# Patient Record
Sex: FEMALE
[De-identification: names, ages, dates, MRNs, and addresses within clinical notes are randomized per-mention and may not be internally consistent; named-entity substitution may affect disease eponyms.]

---

## 2019-06-19 ENCOUNTER — APPOINTMENT (OUTPATIENT)
Dept: ORTHOPEDIC SURGERY | Facility: CLINIC | Age: 44
End: 2019-06-19
Payer: OTHER MISCELLANEOUS

## 2019-06-19 VITALS
BODY MASS INDEX: 25.61 KG/M2 | TEMPERATURE: 98.4 F | HEIGHT: 64 IN | HEART RATE: 59 BPM | SYSTOLIC BLOOD PRESSURE: 101 MMHG | DIASTOLIC BLOOD PRESSURE: 66 MMHG | WEIGHT: 150 LBS

## 2019-06-19 DIAGNOSIS — E11.9 TYPE 2 DIABETES MELLITUS W/OUT COMPLICATIONS: ICD-10-CM

## 2019-06-19 PROCEDURE — 99204 OFFICE O/P NEW MOD 45 MIN: CPT

## 2019-06-19 NOTE — HISTORY OF PRESENT ILLNESS
[FreeTextEntry1] : the patient is a 43-year-old female who presents for evaluation of right shoulder pain following a fall on the third. Since that time she has had pain as well as "clicking" of the shoulder with range of motion. She also notes a sensation of clicking in the middleand ring fingers on the right side as well as her wrist, she denies paresthesias or locking of the digits.

## 2019-06-19 NOTE — PHYSICAL EXAM
[de-identified] : x-rays of the shoulder and hand are reviewed from an outside imaging source there is no fracture or dislocation, no osseus abnormality [de-identified] : Right shoulder exam\par \par Inspection: No malalignment, No defects\par Skin: No masses, No lesions\par Neck: Negative Spurlings, full ROM without pain\par ROM: Range of motion of bilateral shoulders intact, palpable crepitus on the right side with subjective pain \par Tenderness: No bicipital tenderness, no tenderness to the greater tuberosity/RTC insertion, no anterior shoulder/lesser tuberosity tenderness\par Strength: 5/5 ER, 5/5 IR in adduction, 5/5 supraspinatus testing\par AC Joint: No ttp, no pain with cross arm testing\par Biceps: Speed negative\par Impingement test: Negative Benjamin\par Stability: Negative apprehension, negative anterior/posterior load and shift\par Vasc: 2+ radial pulse\par Neuro: AIN, PIN, Ulnar nerve in tact to motor\par Sensation: In tact to light touch throughout\par \par

## 2019-06-19 NOTE — ASSESSMENT
[FreeTextEntry1] : the patient is a 43-year-old female with right shoulder pain following a fall approximately 7 weeks ago. She has painful crepitus with range of motion, I recommend MRI for further workup. She will continue activity as tolerated and followup to review the results of the MRI and discuss further treatment.

## 2019-06-19 NOTE — CONSULT LETTER
[Consult Letter:] : I had the pleasure of evaluating your patient, [unfilled]. [Please see my note below.] : Please see my note below. [Consult Closing:] : Thank you very much for allowing me to participate in the care of this patient.  If you have any questions, please do not hesitate to contact me. [Sincerely,] : Sincerely, [FreeTextEntry3] : Dr. Chuyita Hutchinson\par Orthopaedic Surgery\par Hand & Upper Extremity.\par

## 2019-06-25 ENCOUNTER — OUTPATIENT (OUTPATIENT)
Dept: OUTPATIENT SERVICES | Facility: HOSPITAL | Age: 44
LOS: 1 days | End: 2019-06-25

## 2019-06-25 ENCOUNTER — APPOINTMENT (OUTPATIENT)
Dept: MRI IMAGING | Facility: CLINIC | Age: 44
End: 2019-06-25
Payer: OTHER MISCELLANEOUS

## 2019-06-25 DIAGNOSIS — M25.811 OTHER SPECIFIED JOINT DISORDERS, RIGHT SHOULDER: ICD-10-CM

## 2019-06-25 PROCEDURE — 73221 MRI JOINT UPR EXTREM W/O DYE: CPT | Mod: 26,RT

## 2019-08-02 ENCOUNTER — RX RENEWAL (OUTPATIENT)
Age: 44
End: 2019-08-02

## 2019-08-02 RX ORDER — IBUPROFEN 800 MG/1
800 TABLET, FILM COATED ORAL 3 TIMES DAILY
Qty: 90 | Refills: 0 | Status: ACTIVE | COMMUNITY
Start: 2019-06-19 | End: 1900-01-01

## 2019-08-12 ENCOUNTER — APPOINTMENT (OUTPATIENT)
Dept: ORTHOPEDIC SURGERY | Facility: CLINIC | Age: 44
End: 2019-08-12
Payer: OTHER MISCELLANEOUS

## 2019-08-12 VITALS
WEIGHT: 150 LBS | HEIGHT: 64 IN | DIASTOLIC BLOOD PRESSURE: 68 MMHG | HEART RATE: 67 BPM | BODY MASS INDEX: 25.61 KG/M2 | SYSTOLIC BLOOD PRESSURE: 107 MMHG

## 2019-08-12 DIAGNOSIS — S43.431D SUPERIOR GLENOID LABRUM LESION OF RIGHT SHOULDER, SUBSEQUENT ENCOUNTER: ICD-10-CM

## 2019-08-12 PROCEDURE — 99213 OFFICE O/P EST LOW 20 MIN: CPT

## 2019-08-12 RX ORDER — MELOXICAM 15 MG/1
15 TABLET ORAL
Qty: 20 | Refills: 1 | Status: ACTIVE | COMMUNITY
Start: 2019-08-12 | End: 1900-01-01

## 2019-08-12 NOTE — ASSESSMENT
[FreeTextEntry1] : the patient is a 43-year-old female with right shoulder pain following a fall. Her MRI and exam are consistent with a SLAP tear as well as mild adhesive capsulitis. I recommenda course of physical therapy. She will followupin 4-6 weeks for a clinical evaluation.

## 2019-08-12 NOTE — HISTORY OF PRESENT ILLNESS
[FreeTextEntry1] : 6/19/19:  the patient is a 43-year-old female who presents for evaluation of right shoulder pain following a fall on the third. Since that time she has had pain as well as "clicking" of the shoulder with range of motion. She also notes a sensation of clicking in the middleand ring fingers on the right side as well as her wrist, she denies paresthesias or locking of the digits.\par \par 8/12/19: the patient returns today for followup of her right shoulder pain. She continues to have activity-related pain and some clicking of the shoulder that has not improved since her previous visit. She had an MRI and presents to review the results.

## 2019-08-12 NOTE — PHYSICAL EXAM
[de-identified] : Right shoulder exam\par \par Inspection: No malalignment, No defects\par Skin: No masses, No lesions\par Neck: Negative Spurlings, full ROM without pain\par ROM: Range of motion of bilateral shoulders intact, palpable crepitus on the right side with subjective pain \par Tenderness: No bicipital tenderness, no tenderness to the greater tuberosity/RTC insertion, no anterior shoulder/lesser tuberosity tenderness\par Strength: 5/5 ER, 5/5 IR in adduction, 5/5 supraspinatus testing\par AC Joint: No ttp, no pain with cross arm testing\par Biceps: Speed negative\par Impingement test: Negative Benjamin\par Stability: Negative apprehension, negative anterior/posterior load and shift\par Vasc: 2+ radial pulse\par Neuro: AIN, PIN, Ulnar nerve in tact to motor\par Sensation: In tact to light touch throughout\par \par  [de-identified] : MRI of the right shoulder is reviewed there is a SLAP tear as well as thickening of the anterior capsule no degenerative joint changes or rotator cuffpathology

## 2019-08-12 NOTE — PHYSICAL EXAM
[de-identified] : Right shoulder exam\par \par Inspection: No malalignment, No defects\par Skin: No masses, No lesions\par Neck: Negative Spurlings, full ROM without pain\par ROM: Range of motion of bilateral shoulders intact, palpable crepitus on the right side with subjective pain \par Tenderness: No bicipital tenderness, no tenderness to the greater tuberosity/RTC insertion, no anterior shoulder/lesser tuberosity tenderness\par Strength: 5/5 ER, 5/5 IR in adduction, 5/5 supraspinatus testing\par AC Joint: No ttp, no pain with cross arm testing\par Biceps: Speed negative\par Impingement test: Negative Benjamin\par Stability: Negative apprehension, negative anterior/posterior load and shift\par Vasc: 2+ radial pulse\par Neuro: AIN, PIN, Ulnar nerve in tact to motor\par Sensation: In tact to light touch throughout\par \par  [de-identified] : MRI of the right shoulder is reviewed there is a SLAP tear as well as thickening of the anterior capsule no degenerative joint changes or rotator cuffpathology

## 2019-11-18 ENCOUNTER — APPOINTMENT (OUTPATIENT)
Dept: ORTHOPEDIC SURGERY | Facility: CLINIC | Age: 44
End: 2019-11-18